# Patient Record
Sex: MALE | Race: BLACK OR AFRICAN AMERICAN | NOT HISPANIC OR LATINO | Employment: FULL TIME | URBAN - METROPOLITAN AREA
[De-identification: names, ages, dates, MRNs, and addresses within clinical notes are randomized per-mention and may not be internally consistent; named-entity substitution may affect disease eponyms.]

---

## 2025-06-06 ENCOUNTER — APPOINTMENT (OUTPATIENT)
Dept: ORTHOPEDIC SURGERY | Facility: CLINIC | Age: 45
End: 2025-06-06
Payer: COMMERCIAL

## 2025-06-19 ENCOUNTER — OFFICE VISIT (OUTPATIENT)
Dept: CARDIOLOGY | Facility: HOSPITAL | Age: 45
End: 2025-06-19
Payer: COMMERCIAL

## 2025-06-19 VITALS
DIASTOLIC BLOOD PRESSURE: 82 MMHG | HEIGHT: 72 IN | HEART RATE: 78 BPM | WEIGHT: 315 LBS | SYSTOLIC BLOOD PRESSURE: 138 MMHG | BODY MASS INDEX: 42.66 KG/M2 | OXYGEN SATURATION: 97 %

## 2025-06-19 DIAGNOSIS — I50.22 HEART FAILURE WITH MID-RANGE EJECTION FRACTION (HFMEF): ICD-10-CM

## 2025-06-19 DIAGNOSIS — Z00.00 HEALTH CARE MAINTENANCE: ICD-10-CM

## 2025-06-19 DIAGNOSIS — R00.2 PALPITATIONS: Primary | ICD-10-CM

## 2025-06-19 DIAGNOSIS — E66.9 OBESITY, UNSPECIFIED CLASS, UNSPECIFIED OBESITY TYPE, UNSPECIFIED WHETHER SERIOUS COMORBIDITY PRESENT: ICD-10-CM

## 2025-06-19 DIAGNOSIS — R29.818 SUSPECTED SLEEP APNEA: ICD-10-CM

## 2025-06-19 PROCEDURE — 3008F BODY MASS INDEX DOCD: CPT | Performed by: STUDENT IN AN ORGANIZED HEALTH CARE EDUCATION/TRAINING PROGRAM

## 2025-06-19 PROCEDURE — RXMED WILLOW AMBULATORY MEDICATION CHARGE

## 2025-06-19 PROCEDURE — 93005 ELECTROCARDIOGRAM TRACING: CPT | Performed by: STUDENT IN AN ORGANIZED HEALTH CARE EDUCATION/TRAINING PROGRAM

## 2025-06-19 PROCEDURE — 99205 OFFICE O/P NEW HI 60 MIN: CPT | Performed by: STUDENT IN AN ORGANIZED HEALTH CARE EDUCATION/TRAINING PROGRAM

## 2025-06-19 PROCEDURE — 1036F TOBACCO NON-USER: CPT | Performed by: STUDENT IN AN ORGANIZED HEALTH CARE EDUCATION/TRAINING PROGRAM

## 2025-06-19 PROCEDURE — 99211 OFF/OP EST MAY X REQ PHY/QHP: CPT

## 2025-06-19 RX ORDER — SACUBITRIL AND VALSARTAN 97; 103 MG/1; MG/1
1 TABLET, FILM COATED ORAL 2 TIMES DAILY
COMMUNITY

## 2025-06-19 RX ORDER — SPIRONOLACTONE 25 MG/1
25 TABLET ORAL DAILY
COMMUNITY

## 2025-06-19 RX ORDER — CARVEDILOL 25 MG/1
25 TABLET ORAL 2 TIMES DAILY
COMMUNITY

## 2025-06-19 RX ORDER — DAPAGLIFLOZIN 10 MG/1
10 TABLET, FILM COATED ORAL DAILY
Qty: 30 TABLET | Refills: 11 | Status: SHIPPED | OUTPATIENT
Start: 2025-06-19 | End: 2026-06-19

## 2025-06-19 RX ORDER — HYDRALAZINE HYDROCHLORIDE 10 MG/1
10 TABLET, FILM COATED ORAL 3 TIMES DAILY
COMMUNITY

## 2025-06-19 NOTE — PATIENT INSTRUCTIONS
Thank you for coming in today. If you have any questions or concerns, you may call the Heart Failure Office at [phone] option [phone], or [phone].  You may also contact our heart failure nursing team via email on [email or web address].    For quicker results set-up your  [employer] account to receive results and other correspondence directly to your phone.    Please bring all your pills/medications to your Cardiology appointments.    **  - Please make the following medication changes:  1. START Farxiga 10 mg once daily, we will give you coupons for this new pill today    - Please have the following tests done:  1.Blood tests (lipids, comprehensive panel, BNP, CBC, TSH with auto reflex)    2. Echocardiogram, CALL  [phone]   OR    [phone] to schedule      - You will be referred to the following teams, CALL  [phone] to schedule your appointments with:  1.  Endocrinology weight loss team    2.  Sleep medicine (suspected sleep apnea)    3. Primary care ( health maintenance)    -You will be referred to our pharmacy assistance team to help with medication( Entresto, and Farxiga)  cost. Please CALL them directly on [phone] to make your first appointment    - Please make an appointment to be seen in 3 months

## 2025-06-19 NOTE — PROGRESS NOTES
"Last seen by me 2020  Referring Physician: Dr.C Scott    History of Present Illness    44 y.o.   who presents to continue OP advanced heart failure care for HFimprovedEF. He has a past medical history significant for HTN, DMII, morbid obesity with BMI ~ 46 kg/m2, h/o VSD repair as a 9 year old who was admitted for the evaluation of \"chest soreness\" -. He had a positive stress test and underwent LHC 2019 which showed, \"Angiographically normal coronary arteries in a right-dominant system. Aortography demonstrates a dilated ascending aorta and aortic arch.\" He was discharged with escalated ACEi doses and carvedilol was commenced. Currently his 2020 TTE shows LVEF 45-50%.   He has HFimprovedEF.  Since 2020, he has had his subsequent heart failure cardiology care at the [facility] under Dr. Sameer Anguiano and his team, he was last seen by them 2024.  His most recent TTE at the [facility] 2023, per report LVEF 55-60% with small residual VSD    There is a history of snoring, and a sleep study was requested and not yet done.     He has regained weight      Symptomatically, there is no history of chest pain, SOB at rest, SOBOE, orthopnoea, PND, bendopnoea, syncope, pre syncope, palpitations, or leg swelling.     Functionally, he can climb 3 flights of stairs.     He still is exposed to second hand smoke, his wife smokes.     No HF hospitalizations, no ED visits    Surgical Hx:  - VSD repair     Social Hx:  - Smoking- quit 2018. 4 cigarettes/day x 4 years  - 2nd hand smoke exposure- wife smokes  - ETOH- never a heavy drinker, few drinks a year  - Illicit drugs-quit marijuana 3/2019, prev 2 joints daily for ~ 18 years   - 1 daughter- healthy 11 year old      Family Hx:  Specifically, there is no family history of CAD, heart failure, ICD, PPM, LVAD, OHT, arrhythmias, CVA, or sudden cardiac death.     Father- HTN   Mother- DM  Maternal gmother- DM  Cousin  suddenly at " age 40 years ? cause (? ETOH related)      Review of Systems   Constitutional: Negative.   Cardiovascular:  Negative for dyspnea on exertion, leg swelling, orthopnea, palpitations and paroxysmal nocturnal dyspnea.   Respiratory:  Negative for cough and shortness of breath.    Gastrointestinal: Negative.    Genitourinary: Negative.    Neurological:  Negative for dizziness, focal weakness and light-headedness.      Medication reconciliation completed, see below.      Medication Documentation Review Audit       Reviewed by Carol Valencia RN (Registered Nurse) on 06/19/25 at 1549      Medication Order Taking? Sig Documenting Provider Last Dose Status   carvedilol (Coreg) 25 mg tablet [id number] Yes Take 1 tablet (25 mg) by mouth 2 times a day. Historical Provider, MD  Active   hydrALAZINE (Apresoline) 10 mg tablet [id number] Yes Take 1 tablet (10 mg) by mouth 3 times a day. Historical Provider, MD  Active   sacubitriL-valsartan (Entresto)  mg tablet [id number] Yes Take 1 tablet by mouth 2 times a day. Historical Provider, MD  Active   spironolactone (Aldactone) 25 mg tablet [id number] Yes Take 1 tablet (25 mg) by mouth once daily. Historical Provider, MD  Active                   Allergies  Reviewed by Carol Valencia RN on 6/19/2025        Severity Reactions Comments    Bee Venom Protein (honey Bee) Not Specified Hives            Ix:  TTE 1/2020  1. The left ventricular systolic function is mildly decreased with a 45-50% estimated ejection fraction.  2. There is a small ventricular septal defect.  3. There is moderate dilation of the aortic root. There is mild dilatation of the ascending aorta.  4. There is moderate dilatation of the ascending aorta.  5. There is global hypokinesis of the left ventricle with minor regional variations.  6. The RV systolic pressure estimated from the systolic blood pressure and the peak VSD velocity (4.35 M/s) is 40 mmHg.        TTE 4/2019  1. The left ventricular systolic  function is mildly to moderately decreased with a 40-45% estimated ejection fraction.  2. Spectral Doppler shows a pseudonormal pattern of left ventricular diastolic filling.  3. There is a small ventricular septal defect with left to right shunting.  4. The left atrium is moderately dilated.  5. Slightly elevated RVSP.  6. There is mild aortic valve regurgitation.  7. There is a ventricular septal defect.  8. There is moderate dilatation of the aortic root.  9. There is moderate dilatation of the ascending aorta.  LAs: 4.40 cm (2.7-4.0cm)  IVSd: 1.29 cm (0.6-1.1cm)  LVPWd: 1.08 cm (0.6-1.1cm)  LVIDd: 5.81 cm (3.9-5.9cm)  LVIDs: 3.86 cm  LV Mass Index: 110.9 g/m2  LV % FS 33.6 %     TTE 11/2023    CONCLUSIONS:   - Technically difficult exam due to body habitus.   - Exam indication: Re-evaluation of known ascending aortic dilatation to establish   baseline     - The left ventricle is normal in size. Left ventricular systolic function is   normal. EF = 55 ± 5% (2D 4-ch.) Normal left ventricular diastolic function.   - The right ventricle is normal in size. Right ventricular systolic function is   normal.   - The visualized aorta is dilated with a maximal dimension of 4.8 cm.   - S/P VSD patch repair. Small residual VSD with left to right shunt flow. Peak   velocity is 4.3 m/s.   - Exam was compared with the prior  echocardiographic exam performed on   10/04/2022. Similar findings - max aorta dimension 0.1 cm greater.     Electronically signed by Peter Shaikh MD on 11/28/2023     The electronic medical record has been reviewed by me for salient history. All cardiovascular imaging and testing available in the electronic medical record, and [employer] has been reviewed. The most recent ECG 6/19/2025 has been reviewed independently by me.    Physical Exam    Visit Vitals  /82   Pulse 78   Ht 1.829 m (6')   Wt (!) 154 kg (340 lb)   SpO2 97%   BMI 46.11 kg/m²   Smoking Status Never   BSA 2.8 m²      On  "examination:    Very pleasant obese AA man in no apparent CP or painful distress   Neck: No JVD or HJR  Chest wall: Healed sternotomy scar, Stable sternum  CVS: HS 1,2.   No added sounds  Resp: CTA bilaterally. Percussion note resonant   Abdomen: SNT, BS wnl  Extremities: No pedal oedema  Skin: warm and dry  CNS: AO x 4, no gross deficits     IMPRESSION:     44 y.o.   who presents to continue OP advanced heart failure care for HFimprovedEF. He has a past medical history significant for HTN, DMII, morbid obesity with BMI ~ 46 kg/m2, h/o VSD repair as a 9 year old who was admitted for the evaluation of \"chest soreness\" 4/11-13/2019. He had a positive stress test and underwent LHC 4/12/2019 which showed, \"Angiographically normal coronary arteries in a right-dominant system. Aortography demonstrates a dilated ascending aorta and aortic arch.\" He was discharged with escalated ACEi doses and carvedilol was commenced. Currently his 1/2020 TTE shows LVEF 45-50%.   He has HFimprovedEF.  Since 4/2020, he has had his subsequent heart failure cardiology care at the [facility] under Dr. Sameer Anguiano and his team, he was last seen by them 11/2024.  His most recent TTE at the [facility] 11/2023, per report LVEF 55-60% with small residual VSD     NYHA Functional Class: 1-2  ACC/AHA Stage B heart failure  Volume status: Euvolaemic  Perfusion status: Warm and well perfused      PLAN:  - Very engaged in HF care and continues to ask excellent Qs.   - Sleep Medicine referral placed today     -Medication optimisation  1. Continue ARNI 97/103 mg bid   2. Continue Carvedilol 25 mg bid   4. Continue Hydralazine 10 mg tid, ni increase tday. has had positional change light headedness and is FC 1  5. Start SGLTi initiation -Dapa 10 mg qd  3. Continue Spironolactone 25 mg qd     - LVEF > 35% and device therapy not indicated   -Heart failure lifestyle modifications discussed and Qs answered.   - We discussed mechanical " support and OHT at past visits, not currently indicted      Aortic Dilation  - Plan for annual MRI aorta to evaluate dilated aorta , will arrange at next visit     Hypertriglyceridemia  - Check  lipids     ED  - Continue BB  - Referred to Urology prev     Obesity  - referred to Endo weight loss clinic     This note was transcribed using the Dragon Dictation system. There may be grammatical, punctuation, or verbiage errors that can occur with voice recognition programs.     Chiara Flynn MD PhD

## 2025-06-20 ENCOUNTER — PHARMACY VISIT (OUTPATIENT)
Dept: PHARMACY | Facility: CLINIC | Age: 45
End: 2025-06-20
Payer: MEDICAID

## 2025-07-02 ENCOUNTER — APPOINTMENT (OUTPATIENT)
Dept: PHARMACY | Facility: HOSPITAL | Age: 45
End: 2025-07-02
Payer: COMMERCIAL

## 2025-07-10 ENCOUNTER — APPOINTMENT (OUTPATIENT)
Dept: CARDIOLOGY | Facility: HOSPITAL | Age: 45
End: 2025-07-10
Payer: COMMERCIAL

## 2025-07-13 PROCEDURE — RXMED WILLOW AMBULATORY MEDICATION CHARGE

## 2025-07-18 ENCOUNTER — PHARMACY VISIT (OUTPATIENT)
Dept: PHARMACY | Facility: CLINIC | Age: 45
End: 2025-07-18
Payer: MEDICAID

## 2025-08-15 ENCOUNTER — HOSPITAL ENCOUNTER (OUTPATIENT)
Dept: CARDIOLOGY | Facility: HOSPITAL | Age: 45
Discharge: HOME | End: 2025-08-15
Payer: COMMERCIAL

## 2025-08-15 DIAGNOSIS — I50.22 HEART FAILURE WITH MID-RANGE EJECTION FRACTION (HFMEF): ICD-10-CM

## 2025-08-15 LAB
AORTIC VALVE MEAN GRADIENT: 3 MMHG
AORTIC VALVE PEAK VELOCITY: 1.24 M/S
AV PEAK GRADIENT: 6 MMHG
AVA (PEAK VEL): 4.61 CM2
AVA (VTI): 5.28 CM2
EJECTION FRACTION APICAL 4 CHAMBER: 71.9
EJECTION FRACTION: 58 %
LEFT ATRIUM VOLUME AREA LENGTH INDEX BSA: 22.9 ML/M2
LEFT VENTRICLE INTERNAL DIMENSION DIASTOLE: 5.6 CM (ref 3.5–6)
LEFT VENTRICULAR OUTFLOW TRACT DIAMETER: 2.8 CM
MITRAL VALVE E/A RATIO: 0.88
RIGHT VENTRICLE FREE WALL PEAK S': 12.1 CM/S

## 2025-08-15 PROCEDURE — C8929 TTE W OR WO FOL WCON,DOPPLER: HCPCS

## 2025-08-15 PROCEDURE — 93306 TTE W/DOPPLER COMPLETE: CPT | Performed by: INTERNAL MEDICINE

## 2025-08-15 PROCEDURE — 2500000004 HC RX 250 GENERAL PHARMACY W/ HCPCS (ALT 636 FOR OP/ED): Mod: SE | Performed by: STUDENT IN AN ORGANIZED HEALTH CARE EDUCATION/TRAINING PROGRAM

## 2025-08-15 RX ADMIN — HUMAN ALBUMIN MICROSPHERES AND PERFLUTREN 0.75 ML: 10; .22 INJECTION, SOLUTION INTRAVENOUS at 11:50

## 2025-08-22 ENCOUNTER — APPOINTMENT (OUTPATIENT)
Dept: CARDIOLOGY | Facility: HOSPITAL | Age: 45
End: 2025-08-22
Payer: COMMERCIAL

## 2025-09-12 ENCOUNTER — APPOINTMENT (OUTPATIENT)
Dept: CARDIOLOGY | Facility: HOSPITAL | Age: 45
End: 2025-09-12
Payer: COMMERCIAL

## 2025-10-03 ENCOUNTER — APPOINTMENT (OUTPATIENT)
Dept: CARDIOLOGY | Facility: HOSPITAL | Age: 45
End: 2025-10-03
Payer: COMMERCIAL

## 2025-10-09 ENCOUNTER — APPOINTMENT (OUTPATIENT)
Dept: ENDOCRINOLOGY | Facility: CLINIC | Age: 45
End: 2025-10-09
Payer: COMMERCIAL